# Patient Record
Sex: MALE | Race: WHITE | NOT HISPANIC OR LATINO | Employment: OTHER | URBAN - METROPOLITAN AREA
[De-identification: names, ages, dates, MRNs, and addresses within clinical notes are randomized per-mention and may not be internally consistent; named-entity substitution may affect disease eponyms.]

---

## 2023-10-13 PROBLEM — M25.561 RIGHT KNEE PAIN: Status: ACTIVE | Noted: 2023-10-13

## 2023-10-13 PROBLEM — S80.01XA CONTUSION OF RIGHT KNEE: Status: ACTIVE | Noted: 2023-10-13

## 2023-10-13 PROBLEM — K42.9 UMBILICAL HERNIA: Status: ACTIVE | Noted: 2023-10-13

## 2023-10-13 PROBLEM — I49.9 IRREGULARLY IRREGULAR CARDIAC RHYTHM: Status: ACTIVE | Noted: 2023-10-13

## 2023-10-13 PROBLEM — M17.11 ARTHRITIS OF KNEE, RIGHT: Status: ACTIVE | Noted: 2023-10-13

## 2023-10-13 RX ORDER — AMLODIPINE BESYLATE 2.5 MG/1
TABLET ORAL
COMMUNITY

## 2023-10-13 RX ORDER — FINASTERIDE 5 MG/1
TABLET, FILM COATED ORAL
COMMUNITY

## 2023-10-13 RX ORDER — HYDROCHLOROTHIAZIDE 25 MG/1
TABLET ORAL
COMMUNITY

## 2023-10-13 RX ORDER — TAMSULOSIN HYDROCHLORIDE 0.4 MG/1
CAPSULE ORAL
COMMUNITY

## 2023-10-16 ENCOUNTER — OFFICE VISIT (OUTPATIENT)
Dept: ORTHOPEDIC SURGERY | Facility: CLINIC | Age: 88
End: 2023-10-16
Payer: MEDICARE

## 2023-10-16 VITALS — HEIGHT: 76 IN | WEIGHT: 290 LBS | BODY MASS INDEX: 35.31 KG/M2

## 2023-10-16 DIAGNOSIS — G89.29 CHRONIC PAIN OF RIGHT KNEE: ICD-10-CM

## 2023-10-16 DIAGNOSIS — M17.11 ARTHRITIS OF KNEE, RIGHT: Primary | ICD-10-CM

## 2023-10-16 DIAGNOSIS — M25.561 CHRONIC PAIN OF RIGHT KNEE: ICD-10-CM

## 2023-10-16 PROCEDURE — 20610 DRAIN/INJ JOINT/BURSA W/O US: CPT | Performed by: ORTHOPAEDIC SURGERY

## 2023-10-16 PROCEDURE — 99213 OFFICE O/P EST LOW 20 MIN: CPT | Performed by: ORTHOPAEDIC SURGERY

## 2023-10-16 PROCEDURE — 1036F TOBACCO NON-USER: CPT | Performed by: ORTHOPAEDIC SURGERY

## 2023-10-16 PROCEDURE — 1125F AMNT PAIN NOTED PAIN PRSNT: CPT | Performed by: ORTHOPAEDIC SURGERY

## 2023-10-16 RX ORDER — TRIAMCINOLONE ACETONIDE 40 MG/ML
40 INJECTION, SUSPENSION INTRA-ARTICULAR; INTRAMUSCULAR
Status: COMPLETED | OUTPATIENT
Start: 2023-10-16 | End: 2023-10-16

## 2023-10-16 RX ORDER — LIDOCAINE HYDROCHLORIDE 10 MG/ML
2 INJECTION INFILTRATION; PERINEURAL
Status: COMPLETED | OUTPATIENT
Start: 2023-10-16 | End: 2023-10-16

## 2023-10-16 RX ADMIN — LIDOCAINE HYDROCHLORIDE 2 ML: 10 INJECTION INFILTRATION; PERINEURAL at 13:58

## 2023-10-16 RX ADMIN — TRIAMCINOLONE ACETONIDE 40 MG: 40 INJECTION, SUSPENSION INTRA-ARTICULAR; INTRAMUSCULAR at 13:58

## 2023-10-16 ASSESSMENT — ENCOUNTER SYMPTOMS: KNEE SWELLING: 1

## 2023-10-16 NOTE — PROGRESS NOTES
Subjective    Patient ID: Chris Nuñez is a 88 y.o. male.    Chief Complaint: Follow-up of the Right Knee       88-year-old male who presents for evaluation of right knee pain.  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  Pain limits functions of ADL including standing, walking and stair climbing.  Previous Kenalog injections have been of benefit.  Last injection was over 3 months ago.  Soon to be going to [place] for the winter and desires a repeat injection.  No new injury.  Has not noted any redness warmth and denies any fevers or chills.    This patient's past medical, social, and family history were reviewed as well as a review of systems including updates on the patient's information encounter sheet    Alert and oriented elderly male found seated on the exam table  Moderately to markedly obese  No apparent distress. Normal affect and decision making. Skin intact without redness, warmth or rash. Regular respiration rate which is not labored.  Right knee reveals a small to mild effusion  No erythema no warmth  Lack of extension of 8 degrees and flexion limited to 105 degrees  Patellofemoral crepitus with range of motion  No gross ligamentous instability    Assessment: Arthritis right knee    Plan: An extended discussion ensued with the patient regarding the treatment options for their knee condition. This included both nonoperative and operative treatment options.. The patient will continue with modifications of activities of daily living as well as an exercise program. As the patient desired an intra-articular knee injection of Kenalog/lidocaine was performed today and tolerated well.. The patient will observe to see if the injection is of benefit. Follow-up on a when necessary basis.    Right Knee     L Inj/Asp: R knee on 10/16/2023 1:58 PM  Indications: pain  Details: 22 G needle, anterolateral approach  Medications: 40 mg triamcinolone acetonide 40 mg/mL; 2 mL lidocaine 10 mg/mL (1 %)  Consent was  given by the patient. Patient was prepped and draped in the usual sterile fashion.              Current Outpatient Medications:     amLODIPine (Norvasc) 2.5 mg tablet, Take by mouth. amLODIPine Besylate 2.5 MG Oral Tablet Refills: 0 Active, Disp: , Rfl:     finasteride (Proscar) 5 mg tablet, Take by mouth.  Finasteride 5 MG Oral Tablet Refills: 0 Active, Disp: , Rfl:     hydroCHLOROthiazide (HYDRODiuril) 25 mg tablet, Take by mouth.  hydroCHLOROthiazide 25 MG Oral Tablet Refills: 0 Active, Disp: , Rfl:     tamsulosin (Flomax) 0.4 mg 24 hr capsule, Take by mouth.  Tamsulosin HCl - 0.4 MG Oral Capsule Refills: 0 Active, Disp: , Rfl: